# Patient Record
Sex: MALE | Race: WHITE
[De-identification: names, ages, dates, MRNs, and addresses within clinical notes are randomized per-mention and may not be internally consistent; named-entity substitution may affect disease eponyms.]

---

## 2019-08-29 ENCOUNTER — HOSPITAL ENCOUNTER (EMERGENCY)
Dept: HOSPITAL 1 - ED | Age: 19
LOS: 1 days | Discharge: HOME | End: 2019-08-30
Payer: SELF-PAY

## 2019-08-29 VITALS
WEIGHT: 159 LBS | WEIGHT: 159 LBS | BODY MASS INDEX: 24.1 KG/M2 | HEIGHT: 68 IN | HEIGHT: 68 IN | BODY MASS INDEX: 24.1 KG/M2

## 2019-08-29 DIAGNOSIS — F41.9: Primary | ICD-10-CM

## 2019-08-29 DIAGNOSIS — R00.2: ICD-10-CM

## 2019-08-29 LAB
ALBUMIN SERPL-MCNC: 4.1 G/DL (ref 3.4–5)
ALP SERPL-CCNC: 92 U/L (ref 46–116)
ALT SERPL-CCNC: 30 U/L (ref 16–63)
AST SERPL-CCNC: 11 U/L (ref 15–37)
BASOPHILS NFR BLD: 0.6 % (ref 0–2)
BILIRUB SERPL-MCNC: 0.35 MG/DL (ref 0.2–1)
BUN SERPL-MCNC: 11 MG/DL (ref 7–18)
CALCIUM SERPL-MCNC: 8.5 MG/DL (ref 8.5–10.1)
CHLORIDE SERPL-SCNC: 103 MMOL/L (ref 98–107)
CO2 SERPL-SCNC: 29 MMOL/L (ref 21–32)
CREAT SERPL-MCNC: 1 MG/DL (ref 0.7–1.3)
ERYTHROCYTE [DISTWIDTH] IN BLOOD BY AUTOMATED COUNT: 12.1 % (ref 11.5–14.5)
GFR SERPLBLD BASED ON 1.73 SQ M-ARVRAT: > 60 ML/MIN
GLUCOSE SERPL-MCNC: 121 MG/DL (ref 74–106)
PLATELET # BLD: 169 X10^3MCL (ref 130–400)
POTASSIUM SERPL-SCNC: 3.1 MMOL/L (ref 3.5–5.1)
PROT SERPL-MCNC: 7.4 G/DL (ref 6.4–8.2)
SODIUM SERPL-SCNC: 142 MMOL/L (ref 136–145)
T3 SERPL-MCNC: 1.37 NG/ML
T3RU NFR SERPL: 35 % UPTAKE (ref 33–39)
T4 FREE SERPL-MCNC: 1.08 NG/DL (ref 0.76–1.46)
T4 SERPL-MCNC: 7.4 UG/DL (ref 4.7–13.3)
T4/T3 UPTAKE INDEX SERPL: 2.6 UG/DL (ref 1.4–4.5)

## 2019-08-30 VITALS — DIASTOLIC BLOOD PRESSURE: 70 MMHG | SYSTOLIC BLOOD PRESSURE: 115 MMHG

## 2019-08-30 LAB — AMPHETAMINES UR QL SCN: (no result)

## 2020-06-21 ENCOUNTER — HOSPITAL ENCOUNTER (EMERGENCY)
Dept: HOSPITAL 26 - MED | Age: 20
Discharge: HOME | End: 2020-06-21
Payer: COMMERCIAL

## 2020-06-21 VITALS — DIASTOLIC BLOOD PRESSURE: 80 MMHG | SYSTOLIC BLOOD PRESSURE: 136 MMHG

## 2020-06-21 VITALS — BODY MASS INDEX: 24.44 KG/M2 | HEIGHT: 69 IN | WEIGHT: 165 LBS

## 2020-06-21 DIAGNOSIS — S91.312A: Primary | ICD-10-CM
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
## 2020-06-21 PROCEDURE — 99282 EMERGENCY DEPT VISIT SF MDM: CPT

## 2020-06-21 PROCEDURE — 12001 RPR S/N/AX/GEN/TRNK 2.5CM/<: CPT

## 2020-06-21 NOTE — NUR
Patient discharged with v/s stable by Joseph RICHARDS. No nursing care provided in 
the ER. Written and verbal after care instructions given and explained. 

Patient alert, oriented and verbalized understanding of instructions. 
Ambulatory with steady gait. All questions addressed prior to discharge. ID 
band removed. Patient advised to follow up with PMD. Rx of Bacitracin given. 
Patient educated on indication of medication including possible reaction and 
side effects. Opportunity to ask questions provided and answered.

## 2020-06-21 NOTE — NUR
-------------------------------------------------------------------------------

            *** Note undone in EDM - 06/21/20 at 1655 by QAMAR ***             

-------------------------------------------------------------------------------

Patient discharged with v/s stable by Joseph RICHARDS. No nursing care provided in 
the ER. Written and verbal after care instructions given and explained. 

Patient alert, oriented and verbalized understanding of instructions. 
Ambulatory with steady gait. All questions addressed prior to discharge. ID 
band removed. Patient advised to follow up with PMD. Rx of Bacitracin given. 
Patient educated on indication of medication including possible reaction and 
side effects. Opportunity to ask questions provided and answered. Letter printed

## 2020-07-13 ENCOUNTER — HOSPITAL ENCOUNTER (EMERGENCY)
Dept: HOSPITAL 26 - MED | Age: 20
Discharge: HOME | End: 2020-07-13
Payer: COMMERCIAL

## 2020-07-13 VITALS — SYSTOLIC BLOOD PRESSURE: 118 MMHG | DIASTOLIC BLOOD PRESSURE: 67 MMHG

## 2020-07-13 VITALS — DIASTOLIC BLOOD PRESSURE: 67 MMHG | SYSTOLIC BLOOD PRESSURE: 118 MMHG

## 2020-07-13 VITALS — BODY MASS INDEX: 24.75 KG/M2 | HEIGHT: 69 IN | WEIGHT: 167.13 LBS

## 2020-07-13 DIAGNOSIS — B07.0: Primary | ICD-10-CM

## 2020-07-13 NOTE — NUR
19 YO MALE CO WART ON SOLE OF LEFT FOOT X1M. PT STATED THAT HE HAD IT TREATED 
HERE BUT IT HAS NOT IMPROVED. 



NO PMH AND NO RX